# Patient Record
Sex: FEMALE | Race: OTHER | NOT HISPANIC OR LATINO
[De-identification: names, ages, dates, MRNs, and addresses within clinical notes are randomized per-mention and may not be internally consistent; named-entity substitution may affect disease eponyms.]

---

## 2020-06-11 PROBLEM — Z00.00 ENCOUNTER FOR PREVENTIVE HEALTH EXAMINATION: Status: ACTIVE | Noted: 2020-06-11

## 2020-06-30 ENCOUNTER — LABORATORY RESULT (OUTPATIENT)
Age: 60
End: 2020-06-30

## 2020-06-30 ENCOUNTER — APPOINTMENT (OUTPATIENT)
Dept: PULMONOLOGY | Facility: CLINIC | Age: 60
End: 2020-06-30
Payer: COMMERCIAL

## 2020-06-30 VITALS
WEIGHT: 163.19 LBS | BODY MASS INDEX: 30.03 KG/M2 | HEIGHT: 62 IN | HEART RATE: 75 BPM | SYSTOLIC BLOOD PRESSURE: 125 MMHG | DIASTOLIC BLOOD PRESSURE: 83 MMHG | TEMPERATURE: 97.7 F | OXYGEN SATURATION: 97 %

## 2020-06-30 DIAGNOSIS — Z83.3 FAMILY HISTORY OF DIABETES MELLITUS: ICD-10-CM

## 2020-06-30 DIAGNOSIS — E78.5 HYPERLIPIDEMIA, UNSPECIFIED: ICD-10-CM

## 2020-06-30 DIAGNOSIS — Z80.0 FAMILY HISTORY OF MALIGNANT NEOPLASM OF DIGESTIVE ORGANS: ICD-10-CM

## 2020-06-30 DIAGNOSIS — Z78.9 OTHER SPECIFIED HEALTH STATUS: ICD-10-CM

## 2020-06-30 PROCEDURE — 99244 OFF/OP CNSLTJ NEW/EST MOD 40: CPT

## 2020-06-30 RX ORDER — SIMVASTATIN 80 MG/1
TABLET, FILM COATED ORAL
Refills: 0 | Status: ACTIVE | COMMUNITY

## 2020-06-30 NOTE — PHYSICAL EXAM
[No Acute Distress] : no acute distress [Normal Appearance] : normal appearance [Normal Oropharynx] : normal oropharynx [Normal S1, S2] : normal s1, s2 [No Neck Mass] : no neck mass [Normal Rate/Rhythm] : normal rate/rhythm [No Murmurs] : no murmurs [No Resp Distress] : no resp distress [Clear to Auscultation Bilaterally] : clear to auscultation bilaterally [No Abnormalities] : no abnormalities [Benign] : benign [No Cyanosis] : no cyanosis [Normal Gait] : normal gait [No Clubbing] : no clubbing [FROM] : FROM [No Edema] : no edema [No Focal Deficits] : no focal deficits [Normal Color/ Pigmentation] : normal color/ pigmentation [Oriented x3] : oriented x3 [Normal Affect] : normal affect

## 2020-06-30 NOTE — PROCEDURE
[FreeTextEntry1] : CT chest from 6/4/2020: bronchial thickening, peripheral reticulations at bases, right lung apex with scarring and bronchiectasis, bilateral GGOs at bases; no honeycombing. Compared to 2012 imaging these findings are worse (disk is not available for review).

## 2020-06-30 NOTE — ASSESSMENT
[FreeTextEntry1] : 60yo woman with HLD, Raynaud's and asthma referred for evaluation of CT chest findings. Referred by Dr. Cerrato. Ms. rAaya has signs of interstitial lung disease c/w NSIP pattern of unclear etiology. She is not even aware of the 2012 findings. Has no pulmonary symptoms and no history suggestive of clear etiology of lung fibrosis. This may be connected to her Raynaud's and asthma diagnoses as well. Will check further ILD related serology, as well as PFTs and 6MWT. Once PFTs are resulted will be able to provide full risk stratification for her cervical surgery.\par \par PCP: Dr. Sherwin Cerrato, [phone]\par Surgeon: Dr. Dell Gonzalez, [phone], [address]

## 2020-06-30 NOTE — CONSULT LETTER
[Dear  ___] : Dear  [unfilled], [Consult Letter:] : I had the pleasure of evaluating your patient, [unfilled]. [Please see my note below.] : Please see my note below. [Consult Closing:] : Thank you very much for allowing me to participate in the care of this patient.  If you have any questions, please do not hesitate to contact me. [DrGarcía  ___] : Dr. MENDES [FreeTextEntry3] : Helena Cota MD\par \par [employer] [facility] [employer] at [facility]\par Pulmonary, Critical Care, and Sleep Medicine\par  [Sincerely,] : Sincerely,

## 2020-07-02 ENCOUNTER — TRANSCRIPTION ENCOUNTER (OUTPATIENT)
Age: 60
End: 2020-07-02

## 2020-07-07 LAB
ALBUMIN SERPL ELPH-MCNC: 4.7 G/DL
ALP BLD-CCNC: 111 U/L
ALT SERPL-CCNC: 19 U/L
ANA PAT FLD IF-IMP: ABNORMAL
ANA PATTERN: ABNORMAL
ANA SER IF-ACNC: ABNORMAL
ANA TITER: ABNORMAL
ANION GAP SERPL CALC-SCNC: 14 MMOL/L
AST SERPL-CCNC: 19 U/L
BASOPHILS # BLD AUTO: 0.1 K/UL
BASOPHILS NFR BLD AUTO: 1.2 %
BILIRUB SERPL-MCNC: 0.2 MG/DL
BUN SERPL-MCNC: 12 MG/DL
CALCIUM SERPL-MCNC: 9.8 MG/DL
CCP AB SER IA-ACNC: <8 UNITS
CHLORIDE SERPL-SCNC: 104 MMOL/L
CK SERPL-CCNC: 94 U/L
CO2 SERPL-SCNC: 24 MMOL/L
CREAT SERPL-MCNC: 0.76 MG/DL
DSDNA AB SER-ACNC: <12 IU/ML
ENA JO1 AB SER IA-ACNC: <0.2 AL
ENA SCL70 IGG SER IA-ACNC: <0.2 AL
ENA SS-A AB SER IA-ACNC: <0.2 AL
ENA SS-B AB SER IA-ACNC: <0.2 AL
EOSINOPHIL # BLD AUTO: 0.32 K/UL
EOSINOPHIL NFR BLD AUTO: 3.9 %
GLUCOSE SERPL-MCNC: 70 MG/DL
HCT VFR BLD CALC: 46.1 %
HGB BLD-MCNC: 14.4 G/DL
IMM GRANULOCYTES NFR BLD AUTO: 0.5 %
LYMPHOCYTES # BLD AUTO: 2.26 K/UL
LYMPHOCYTES NFR BLD AUTO: 27.7 %
MAN DIFF?: NORMAL
MCHC RBC-ENTMCNC: 29.6 PG
MCHC RBC-ENTMCNC: 31.2 GM/DL
MCV RBC AUTO: 94.7 FL
MONOCYTES # BLD AUTO: 0.96 K/UL
MONOCYTES NFR BLD AUTO: 11.8 %
MPO AB + PR3 PNL SER: NORMAL
NEUTROPHILS # BLD AUTO: 4.48 K/UL
NEUTROPHILS NFR BLD AUTO: 54.9 %
PLATELET # BLD AUTO: 308 K/UL
POTASSIUM SERPL-SCNC: 5.1 MMOL/L
PROT SERPL-MCNC: 8 G/DL
RBC # BLD: 4.87 M/UL
RBC # FLD: 15.5 %
RF+CCP IGG SER-IMP: NEGATIVE
RHEUMATOID FACT SER QL: 10 IU/ML
SODIUM SERPL-SCNC: 142 MMOL/L
WBC # FLD AUTO: 8.16 K/UL

## 2020-07-13 ENCOUNTER — LABORATORY RESULT (OUTPATIENT)
Age: 60
End: 2020-07-13

## 2020-07-15 ENCOUNTER — APPOINTMENT (OUTPATIENT)
Dept: PULMONOLOGY | Facility: CLINIC | Age: 60
End: 2020-07-15
Payer: COMMERCIAL

## 2020-07-15 VITALS
WEIGHT: 165 LBS | HEART RATE: 64 BPM | SYSTOLIC BLOOD PRESSURE: 102 MMHG | OXYGEN SATURATION: 97 % | BODY MASS INDEX: 30.36 KG/M2 | TEMPERATURE: 96.5 F | HEIGHT: 62 IN | DIASTOLIC BLOOD PRESSURE: 68 MMHG

## 2020-07-15 PROCEDURE — 94726 PLETHYSMOGRAPHY LUNG VOLUMES: CPT

## 2020-07-15 PROCEDURE — ZZZZZ: CPT

## 2020-07-15 PROCEDURE — 94618 PULMONARY STRESS TESTING: CPT

## 2020-07-15 PROCEDURE — 94010 BREATHING CAPACITY TEST: CPT

## 2020-07-15 PROCEDURE — 99214 OFFICE O/P EST MOD 30 MIN: CPT | Mod: 25

## 2020-07-15 PROCEDURE — 94729 DIFFUSING CAPACITY: CPT

## 2020-07-15 NOTE — PROCEDURE
[FreeTextEntry1] : PFTs from 7/15/2020\par *FEV1 85\par *FEV1/FVC 85\par *TLC 96\par *DLCO 54\par \par 6MWT from 7/15/2020\par pre/post HR 72/101\par pre/post saturation 99/95\par pre/post Alex Dyspnea  2/2\par pre/post Alex Fatigue  2/2\par meters walked 1700\par \par \par

## 2020-07-15 NOTE — CONSULT LETTER
[Dear  ___] : Dear  [unfilled], [Please see my note below.] : Please see my note below. [Consult Letter:] : I had the pleasure of evaluating your patient, [unfilled]. [Consult Closing:] : Thank you very much for allowing me to participate in the care of this patient.  If you have any questions, please do not hesitate to contact me. [DrGarcía  ___] : Dr. MENDES [FreeTextEntry3] : Helena Cota MD\par \par [place] [facility] [employer] at [facility]\par Pulmonary, Critical Care, and Sleep Medicine\par  [Sincerely,] : Sincerely,

## 2020-07-15 NOTE — PHYSICAL EXAM
[No Acute Distress] : no acute distress [Normal Appearance] : normal appearance [Normal Oropharynx] : normal oropharynx [Normal S1, S2] : normal s1, s2 [Normal Rate/Rhythm] : normal rate/rhythm [No Neck Mass] : no neck mass [Clear to Auscultation Bilaterally] : clear to auscultation bilaterally [No Murmurs] : no murmurs [No Abnormalities] : no abnormalities [No Resp Distress] : no resp distress [Benign] : benign [Normal Gait] : normal gait [No Clubbing] : no clubbing [No Cyanosis] : no cyanosis [FROM] : FROM [No Edema] : no edema [Oriented x3] : oriented x3 [Normal Color/ Pigmentation] : normal color/ pigmentation [No Focal Deficits] : no focal deficits [Normal Affect] : normal affect

## 2020-07-15 NOTE — HISTORY OF PRESENT ILLNESS
[Former] : former [Never] : never [TextBox_4] : 60yo woman with HLD, Raynaud's and asthma referred for evaluation of CT chest findings. She is pending cervical spine surgery and during that workup had chest imaging. Imaging done at [facility]. No pulmonary symptoms. She had a diagnosis of asthma about 30 years ago with rare use of FRANCISCO JAVIER. Also diagnosed with Raynaud's at around the time. Able to exercise without any limitations. 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 No direct exposures to asbestos, mold that she is aware of. Very mild smoking in her 20s, but minimal. Never intubated for asthma; rare prednisone use. Does not use a maintenance inhaler. \par \par \par 7/15/2020\par 58 yo F with Raynaud's syndrome initially presenting for abnormal CT scan performed in the context of a pre-op eval and prior abnormal CT. She is here for follow up to discuss lab test results and PFT for further evaluation. Aside from her persistent neck and back pain, she feels well and has no respiratory symptoms including dyspnea (at rest and exertion).

## 2022-11-28 ENCOUNTER — NON-APPOINTMENT (OUTPATIENT)
Age: 62
End: 2022-11-28

## 2022-12-23 ENCOUNTER — APPOINTMENT (OUTPATIENT)
Dept: PULMONOLOGY | Facility: CLINIC | Age: 62
End: 2022-12-23

## 2022-12-23 VITALS
TEMPERATURE: 97.8 F | BODY MASS INDEX: 30.18 KG/M2 | SYSTOLIC BLOOD PRESSURE: 106 MMHG | DIASTOLIC BLOOD PRESSURE: 73 MMHG | HEIGHT: 62 IN | OXYGEN SATURATION: 93 % | WEIGHT: 164 LBS | HEART RATE: 83 BPM

## 2022-12-23 PROCEDURE — 99214 OFFICE O/P EST MOD 30 MIN: CPT

## 2022-12-24 NOTE — ASSESSMENT
[FreeTextEntry1] : PFTs from 7/15/2020\par *FEV1 85\par *FEV1/FVC 85\par *TLC 96\par *DLCO 54\par REVIEWED\par 6MWT from 7/15/2020\par pre/post HR 72/101\par pre/post saturation 99/95\par pre/post Alex Dyspnea 2/2\par pre/post Alex Fatigue 2/2\par meters walked 1700\par \par \par PFTs from 7/15/2020\par *FEV1 85\par *FEV1/FVC 85\par *TLC 96\par *DLCO 54\par \par 6MWT from 7/15/2020\par pre/post HR 72/101\par pre/post saturation 99/95\par pre/post Alex Dyspnea 2/2\par pre/post Alex Fatigue 2/2\par meters walked 1700\par \par 58yo F w/ hx of asthma, Raynaud's presenting with abnormal CT chest  following up for SOB/cough which have been persistent after recent COVID19 infection. Part of her symptoms are driven by PND; rx for flonase and azelastine prescribed; needs to use it for at least 4 weeks to see benefit. However, due to hx of ILD need to ensure that there is no ILD progression driving her symptoms. Prior CT in 2020 showed mild basilar predominant bronchiectasis; peripheral reticular changes; and some GGO's in the left lower lobe. Images were unable to be reviewed as she did not have her CD with her (requested a copy from [facility] but never received; asker to get me the CD). Per report, may be NSIP which in the context of her Raynaud's dx and elevated NORM, could be the case. She is indicated for repeat CT chest and PFTs/6MWT. Follow up after PFTs/6MWT, CT chest and 6 weeks of PND treatment.

## 2022-12-24 NOTE — CONSULT LETTER
[Dear  ___] : Dear  [unfilled], [Courtesy Letter:] : I had the pleasure of seeing your patient, [unfilled], in my office today. [Please see my note below.] : Please see my note below. [Consult Closing:] : Thank you very much for allowing me to participate in the care of this patient.  If you have any questions, please do not hesitate to contact me. [Sincerely,] : Sincerely, [FreeTextEntry3] : Helena Cota MD\par \par [place] [employer] at [facility]\par Pulmonary, Critical Care, and Sleep Medicine\par

## 2022-12-24 NOTE — HISTORY OF PRESENT ILLNESS
[TextBox_4] : 58yo woman with HLD, Raynaud's and asthma referred for evaluation of CT chest findings. She is pending cervical spine surgery and during that workup had chest imaging. Imaging done at [facility]. No pulmonary symptoms. She had a diagnosis of asthma about 30 years ago with rare use of FRANCISCO JAVIER. Also diagnosed with Raynaud's at around the time. Able to exercise without any limitations. 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 No direct exposures to asbestos, mold that she is aware of. Very mild smoking in her 20s, but minimal. Never intubated for asthma; rare prednisone use. Does not use a maintenance inhaler. \par \par \par 7/15/2020\par 58 yo F with Raynaud's syndrome initially presenting for abnormal CT scan performed in the context of a pre-op eval and prior abnormal CT. She is here for follow up to discuss lab test results and PFT for further evaluation. Aside from her persistent neck and back pain, she feels well and has no respiratory symptoms including dyspnea (at rest and exertion).\par \par 12/23/2022\par Last follow up in 2020. Had COVID in Sept/Oct 2022 and since then has had an issue with chest congestion and cough. Recovered from COVID at home; did not take paxlovid. No imaging since 2020.  [ESS] : 5

## 2022-12-27 ENCOUNTER — NON-APPOINTMENT (OUTPATIENT)
Age: 62
End: 2022-12-27

## 2023-01-23 ENCOUNTER — APPOINTMENT (OUTPATIENT)
Dept: CT IMAGING | Facility: HOSPITAL | Age: 63
End: 2023-01-23
Payer: COMMERCIAL

## 2023-01-23 ENCOUNTER — OUTPATIENT (OUTPATIENT)
Dept: OUTPATIENT SERVICES | Facility: HOSPITAL | Age: 63
LOS: 1 days | End: 2023-01-23
Payer: COMMERCIAL

## 2023-01-23 PROCEDURE — 71250 CT THORAX DX C-: CPT

## 2023-01-23 PROCEDURE — 71250 CT THORAX DX C-: CPT | Mod: 26

## 2023-01-25 ENCOUNTER — APPOINTMENT (OUTPATIENT)
Dept: PULMONOLOGY | Facility: CLINIC | Age: 63
End: 2023-01-25
Payer: COMMERCIAL

## 2023-01-25 PROCEDURE — ZZZZZ: CPT

## 2023-01-25 PROCEDURE — 94060 EVALUATION OF WHEEZING: CPT

## 2023-01-25 PROCEDURE — 94727 GAS DIL/WSHOT DETER LNG VOL: CPT

## 2023-01-25 PROCEDURE — 94729 DIFFUSING CAPACITY: CPT

## 2023-01-25 PROCEDURE — 94618 PULMONARY STRESS TESTING: CPT

## 2023-01-27 ENCOUNTER — NON-APPOINTMENT (OUTPATIENT)
Age: 63
End: 2023-01-27

## 2023-02-01 RX ORDER — ALBUTEROL SULFATE 2.5 MG/3ML
(2.5 MG/3ML) SOLUTION RESPIRATORY (INHALATION) EVERY 6 HOURS
Qty: 1 | Refills: 3 | Status: ACTIVE | COMMUNITY
Start: 2023-02-01 | End: 1900-01-01

## 2023-02-09 ENCOUNTER — APPOINTMENT (OUTPATIENT)
Dept: PULMONOLOGY | Facility: CLINIC | Age: 63
End: 2023-02-09
Payer: COMMERCIAL

## 2023-02-09 VITALS
WEIGHT: 163 LBS | RESPIRATION RATE: 12 BRPM | HEIGHT: 62 IN | DIASTOLIC BLOOD PRESSURE: 74 MMHG | TEMPERATURE: 97.7 F | OXYGEN SATURATION: 97 % | BODY MASS INDEX: 30 KG/M2 | SYSTOLIC BLOOD PRESSURE: 114 MMHG | HEART RATE: 84 BPM

## 2023-02-09 PROCEDURE — 99214 OFFICE O/P EST MOD 30 MIN: CPT

## 2023-02-10 RX ORDER — PANTOPRAZOLE 40 MG/1
40 TABLET, DELAYED RELEASE ORAL DAILY
Qty: 1 | Refills: 0 | Status: ACTIVE | COMMUNITY
Start: 2023-02-10 | End: 1900-01-01

## 2023-02-12 NOTE — HISTORY OF PRESENT ILLNESS
[TextBox_4] : 60yo woman with HLD, Raynaud's and asthma referred for evaluation of CT chest findings. She is pending cervical spine surgery and during that workup had chest imaging. Imaging done at [facility]. No pulmonary symptoms. She had a diagnosis of asthma about 30 years ago with rare use of FRANCISCO JAVIER. Also diagnosed with Raynaud's at around the time. Able to exercise without any limitations. 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 No direct exposures to asbestos, mold that she is aware of. Very mild smoking in her 20s, but minimal. Never intubated for asthma; rare prednisone use. Does not use a maintenance inhaler. \par \par \par 7/15/2020\par 60 yo F with Raynaud's syndrome initially presenting for abnormal CT scan performed in the context of a pre-op eval and prior abnormal CT. She is here for follow up to discuss lab test results and PFT for further evaluation. Aside from her persistent neck and back pain, she feels well and has no respiratory symptoms including dyspnea (at rest and exertion).\par \par 12/23/2022\par Last follow up in 2020. Had COVID in Sept/Oct 2022 and since then has had an issue with chest congestion and cough. Recovered from COVID at home; did not take paxlovid. No imaging since 2020. \par \par 2/9/23\par Patient presenting for follow up on cough. She has had prolonged coughing triggered by PFTs she performed in last January. The cough has been episodic, occurring throughout the day, and occasionally productive of white/yellow sputum. No associated fever, chills, dyspnea, nausea or vomiting. She was prescribed ICS but was escalated to ICS/LABA by her PCP w/ mild improvement. She had a chest xray performed which she was told was normal and was prescribed a course of doxycycline which has helped improve her cough. She has noted occasional wheezing during coughing fits and the cough occurs at night. She has not taken any prednisone.

## 2023-02-12 NOTE — ASSESSMENT
[FreeTextEntry1] : REVIEWED\par PFTs from 7/15/2020\par *FEV1 85\par *FEV1/FVC 85\par *TLC 96\par *DLCO 54\par REVIEWED\par 6MWT from 7/15/2020\par pre/post HR 72/101\par pre/post saturation 99/95\par pre/post Alex Dyspnea 2/2\par pre/post Alex Fatigue 2/2\par meters walked 1700\par \par PFTs from 7/15/2020\par *FEV1 85\par *FEV1/FVC 85\par *TLC 96\par *DLCO 54\par \par 6MWT from 7/15/2020\par pre/post HR 72/101\par pre/post saturation 99/95\par pre/post Alex Dyspnea 2/2\par pre/post Alex Fatigue 2/2\par meters walked 1700\par \par CT 1/23/22: Lungs are stable. Stable right apical scarring and bronchiectasis. Scattered pleural-based centimeter or last pulmonary nodular densities. No areas of airspace consolidation. No suspicious pulmonary nodules.\par \par PFT 1/25/23\par FVC 2.01 (70), FEV1 1.64 (73), FEV1/FVC 81, TLC 3.39 (74), RV 1.3 (6.9), DLCO 10.04 (48)\par 6MWT 1/25/23\par 467 m, Pre spO2 96%, Post 92%. Alex 2->2. \par \par 1. Asthma, in exacerbation \par - Patient w/ history of asthma and currently with prolonged coughing and wheezing after PFT. Suspect patient may have had a component of bronchospasm triggered by the breathing exercises associated with her PFT. She is mildly improved with time and ICS/LABA however continues to have cough. Alternative is ILD exacerbation versus infection. Will give a 7 day course of prednisone for possible asthma exacerbation and observe effects. She will continue ICS/LABA and prn albuterol. Patient to keep track of symptoms and call us if her symptoms are not improving. \par \par 2. ILD\par - Patient with evidence of nodular densities distributed along the periphery of her lung with interstitial lung markings. She also has some focal bronchiectasis and thickening of the bronchovascular bundle. There is no pre covid imaging to compare to but suspect that the patient may have an element of organizing pneumonia as a result of her COVID 19 infection. At baseline she has no pulmonary symptoms and thus far we have elected to clinically observe her which she is in agreement with. Her most recent symptoms are likely related to asthma more so than ILD but if her symptoms persist despite prednisone course then she may require further evaluation including extended corticosteroids versus bronchoscopy. Today's PFTs with severe reduction in diffusion\par \par Follow up in 2 to 4 weeks.\par

## 2023-02-12 NOTE — REVIEW OF SYSTEMS
[Cough] : cough [Chest Tightness] : chest tightness [Sputum] : sputum [Wheezing] : wheezing [Fever] : no fever [Fatigue] : no fatigue [Chills] : no chills [Sinus Problems] : no sinus problems [Hemoptysis] : no hemoptysis [Dyspnea] : no dyspnea [SOB on Exertion] : no sob on exertion [Chest Discomfort] : no chest discomfort [Orthopnea] : no orthopnea [GERD] : no gerd [Nocturia] : no nocturia [Arthralgias] : no arthralgias [Myalgias] : no myalgias

## 2023-02-12 NOTE — CONSULT LETTER
[Dear  ___] : Dear  [unfilled], [Courtesy Letter:] : I had the pleasure of seeing your patient, [unfilled], in my office today. [Please see my note below.] : Please see my note below. [Consult Closing:] : Thank you very much for allowing me to participate in the care of this patient.  If you have any questions, please do not hesitate to contact me. [Sincerely,] : Sincerely, [FreeTextEntry3] : Helena Cota MD\par \par [place] [facility] [employer] at [facility]\par Pulmonary, Critical Care, and Sleep Medicine\par

## 2023-02-12 NOTE — PHYSICAL EXAM
[No Acute Distress] : no acute distress [Normal Oropharynx] : normal oropharynx [Normal Rate/Rhythm] : normal rate/rhythm [Normal S1, S2] : normal s1, s2 [No Resp Distress] : no resp distress [No Acc Muscle Use] : no acc muscle use [No Clubbing] : no clubbing [Normal Gait] : normal gait [Oriented x3] : oriented x3 [Normal Affect] : normal affect [TextBox_68] : velcro crackles RLB, no wheezing noted.

## 2023-04-06 ENCOUNTER — APPOINTMENT (OUTPATIENT)
Dept: PULMONOLOGY | Facility: CLINIC | Age: 63
End: 2023-04-06

## 2023-04-21 ENCOUNTER — APPOINTMENT (OUTPATIENT)
Dept: PULMONOLOGY | Facility: CLINIC | Age: 63
End: 2023-04-21
Payer: COMMERCIAL

## 2023-04-21 PROCEDURE — 99213 OFFICE O/P EST LOW 20 MIN: CPT | Mod: 95

## 2023-04-21 RX ORDER — FLUTICASONE FUROATE 200 UG/1
200 POWDER RESPIRATORY (INHALATION) DAILY
Qty: 1 | Refills: 2 | Status: DISCONTINUED | COMMUNITY
Start: 2023-02-01 | End: 2023-04-21

## 2023-04-23 NOTE — CONSULT LETTER
[Dear  ___] : Dear  [unfilled], [Courtesy Letter:] : I had the pleasure of seeing your patient, [unfilled], in my office today. [Please see my note below.] : Please see my note below. [Consult Closing:] : Thank you very much for allowing me to participate in the care of this patient.  If you have any questions, please do not hesitate to contact me. [Sincerely,] : Sincerely, [FreeTextEntry3] : Helena Cota MD\par \par [place] [facility] [employer] at [facility]\par Pulmonary, Critical Care, and Sleep Medicine\par

## 2023-04-23 NOTE — HISTORY OF PRESENT ILLNESS
[Home] : at home, [unfilled] , at the time of the visit. [Medical Office: ([facility])___] : at the medical office located in  [Verbal consent obtained from patient] : the patient, [unfilled] [TextBox_4] : 58yo woman with HLD, Raynaud's and asthma referred for evaluation of CT chest findings. She is pending cervical spine surgery and during that workup had chest imaging. Imaging done at [facility]. No pulmonary symptoms. She had a diagnosis of asthma about 30 years ago with rare use of FRANCISCO JAVIER. Also diagnosed with Raynaud's at around the time. Able to exercise without any limitations. 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 No direct exposures to asbestos, mold that she is aware of. Very mild smoking in her 20s, but minimal. Never intubated for asthma; rare prednisone use. Does not use a maintenance inhaler. \par \par \par 7/15/2020\par 60 yo F with Raynaud's syndrome initially presenting for abnormal CT scan performed in the context of a pre-op eval and prior abnormal CT. She is here for follow up to discuss lab test results and PFT for further evaluation. Aside from her persistent neck and back pain, she feels well and has no respiratory symptoms including dyspnea (at rest and exertion).\par \par 12/23/2022\par Last follow up in 2020. Had COVID in Sept/Oct 2022 and since then has had an issue with chest congestion and cough. Recovered from COVID at home; did not take paxlovid. No imaging since 2020. \par \par 2/9/23\par Patient presenting for follow up on cough. She has had prolonged coughing triggered by PFTs she performed in last January. The cough has been episodic, occurring throughout the day, and occasionally productive of white/yellow sputum. No associated fever, chills, dyspnea, nausea or vomiting. She was prescribed ICS but was escalated to ICS/LABA by her PCP w/ mild improvement. She had a chest xray performed which she was told was normal and was prescribed a course of doxycycline which has helped improve her cough. She has noted occasional wheezing during coughing fits and the cough occurs at night. She has not taken any prednisone. \par \par 4/21/2023\par Improved and at baseline. No SOB. Using ICS/LABA once a day. LAMA once a day. Allergies are exacerbating. [ESS] : 5

## 2023-04-23 NOTE — ASSESSMENT
[FreeTextEntry1] : REVIEWED\par PFTs from 7/15/2020\par *FEV1 85\par *FEV1/FVC 85\par *TLC 96\par *DLCO 54\par REVIEWED\par 6MWT from 7/15/2020\par pre/post HR 72/101\par pre/post saturation 99/95\par pre/post Alex Dyspnea 2/2\par pre/post Alex Fatigue 2/2\par meters walked 1700\par \par PFTs from 7/15/2020\par *FEV1 85\par *FEV1/FVC 85\par *TLC 96\par *DLCO 54\par \par 6MWT from 7/15/2020\par pre/post HR 72/101\par pre/post saturation 99/95\par pre/post Alex Dyspnea 2/2\par pre/post Alex Fatigue 2/2\par meters walked 1700\par \par CT 1/23/22: Lungs are stable. Stable right apical scarring and bronchiectasis. Scattered pleural-based centimeter or last pulmonary nodular densities. No areas of airspace consolidation. No suspicious pulmonary nodules.\par \par PFT 1/25/23\par FVC 2.01 (70), FEV1 1.64 (73), FEV1/FVC 81, TLC 3.39 (74), RV 1.3 (6.9), DLCO 10.04 (48)\par 6MWT 1/25/23\par 467 m, Pre spO2 96%, Post 92%. Alex 2->2. \par \par 1. Asthma, in exacerbation now resolved\par - S/p steroids in February 2023. Should be using ICS/LABA 2 times per day and c/w LAMA.  \par \par 2. ILD\par - Patient with evidence of nodular densities distributed along the periphery of her lung with interstitial lung markings. She also has some focal bronchiectasis and thickening of the bronchovascular bundle. There is no pre covid imaging to compare to but suspect that the patient may have an element of organizing pneumonia as a result of her COVID 19 infection. At baseline she has no pulmonary symptoms and thus far we have elected to clinically observe her which she is in agreement with. Last PFTs with severe reduction in diffusion. \par \par Follow up in 3 months.\par  \par \par  \par 
Hide Cetaphil Products: No
Action 1: Continue
Other Instructions: F/u in 2 months\\n
Start Regimen: Minocycline 90mg ER daily\\nAdapalene nightly
Detail Level: Zone
Samples Given: Ximino 135mg

## 2023-05-23 ENCOUNTER — NON-APPOINTMENT (OUTPATIENT)
Age: 63
End: 2023-05-23

## 2023-08-04 ENCOUNTER — APPOINTMENT (OUTPATIENT)
Dept: PULMONOLOGY | Facility: CLINIC | Age: 63
End: 2023-08-04
Payer: COMMERCIAL

## 2023-08-04 DIAGNOSIS — Z23 ENCOUNTER FOR IMMUNIZATION: ICD-10-CM

## 2023-08-04 PROCEDURE — 99442: CPT

## 2023-08-04 RX ORDER — PREDNISONE 20 MG/1
20 TABLET ORAL
Qty: 14 | Refills: 0 | Status: DISCONTINUED | COMMUNITY
Start: 2023-02-09 | End: 2023-08-04

## 2023-08-04 RX ORDER — FLUTICASONE PROPIONATE 50 UG/1
50 SPRAY, METERED NASAL
Qty: 1 | Refills: 3 | Status: DISCONTINUED | COMMUNITY
Start: 2022-12-23 | End: 2023-08-04

## 2023-08-04 RX ORDER — ALBUTEROL SULFATE 90 UG/1
108 (90 BASE) INHALANT RESPIRATORY (INHALATION)
Qty: 1 | Refills: 2 | Status: ACTIVE | COMMUNITY
Start: 2023-02-01 | End: 1900-01-01

## 2023-08-04 RX ORDER — AZELASTINE HYDROCHLORIDE 137 UG/1
137 SPRAY, METERED NASAL DAILY
Qty: 1 | Refills: 2 | Status: DISCONTINUED | COMMUNITY
Start: 2022-12-23 | End: 2023-08-04

## 2023-08-04 RX ORDER — BUDESONIDE AND FORMOTEROL FUMARATE DIHYDRATE 160; 4.5 UG/1; UG/1
160-4.5 AEROSOL RESPIRATORY (INHALATION) TWICE DAILY
Qty: 2 | Refills: 1 | Status: ACTIVE | COMMUNITY
Start: 2023-02-09 | End: 1900-01-01

## 2023-08-07 PROBLEM — Z23 ENCOUNTER FOR IMMUNIZATION: Status: ACTIVE | Noted: 2023-08-07 | Resolved: 2023-08-21

## 2023-08-07 NOTE — ASSESSMENT
[FreeTextEntry1] : REVIEWED 6MWT from 7/15/2020 pre/post HR 72/101 pre/post saturation 99/95 pre/post Alex Dyspnea 2/2 pre/post Alex Fatigue 2/2 meters walked 1700  PFTs from 7/15/2020 *FEV1 85 *FEV1/FVC 85 *TLC 96 *DLCO 54  6MWT from 7/15/2020 pre/post HR 72/101 pre/post saturation 99/95 pre/post Alex Dyspnea 2/2 pre/post Alex Fatigue 2/2 meters walked 1700  CT 1/23/22: Lungs are stable. Stable right apical scarring and bronchiectasis. Scattered pleural-based centimeter or last pulmonary nodular densities. No areas of airspace consolidation. No suspicious pulmonary nodules.  PFT 1/25/23 FVC 2.01 (70), FEV1 1.64 (73), FEV1/FVC 81, TLC 3.39 (74), RV 1.3 (6.9), DLCO 10.04 (48)  6MWT 1/25/23 467 m, Pre spO2 96%, Post 92%. Alex 2->2.  60yo woman with HLD, Raynaud's and asthma following up. 1. ILD - Patient with evidence of nodular densities distributed along the periphery of her lung with interstitial lung markings. She also has some focal bronchiectasis and thickening of the bronchovascular bundle. There is no pre covid imaging to compare to but suspect that the patient may have an element of organizing pneumonia as a result of her COVID 19 infection. At baseline she has no pulmonary symptoms and thus far we have elected to clinically observe her which she is in agreement with. Last PFTs with severe reduction in diffusion. Has a diagnosis of asthma that predates current CT chest findings but most likely had a mild ILD exacerbation in Feb 2023 which resolved with steroids.  -c/w inhalers as these have been helfpul -need repeat PFTs, 6MWT and CT chest at end of 2023.   Follow up in 3 to 4 months.

## 2023-08-07 NOTE — HISTORY OF PRESENT ILLNESS
[Home] : at home, [unfilled] , at the time of the visit. [Medical Office: ([place] [facility])___] : at the medical office located in  [Verbal consent obtained from patient] : the patient, [unfilled] [Former] : former [< 20 pack-years] : < 20 pack-years [Never] : never [TextBox_4] : 60yo woman with HLD, Raynaud's and asthma referred for evaluation of CT chest findings. She is pending cervical spine surgery and during that workup had chest imaging. Imaging done at [facility]. No pulmonary symptoms. She had a diagnosis of asthma about 30 years ago with rare use of FRANCISCO JAVIER. Also diagnosed with Raynaud's at around the time. Able to exercise without any limitations. 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 No direct exposures to asbestos, mold that she is aware of. Very mild smoking in her 20s, but minimal. Never intubated for asthma; rare prednisone use. Does not use a maintenance inhaler.   7/15/2020 60 yo F with Raynaud's syndrome initially presenting for abnormal CT scan performed in the context of a pre-op eval and prior abnormal CT. She is here for follow up to discuss lab test results and PFT for further evaluation. Aside from her persistent neck and back pain, she feels well and has no respiratory symptoms including dyspnea (at rest and exertion).   12/23/2022 Last follow up in 2020. Had COVID in Sept/Oct 2022 and since then has had an issue with chest congestion and cough. Recovered from COVID at home; did not take paxlovid. No imaging since 2020.   2/9/23 Patient presenting for follow up on cough. She has had prolonged coughing triggered by PFTs she performed in last January. The cough has been episodic, occurring throughout the day, and occasionally productive of white/yellow sputum. No associated fever, chills, dyspnea, nausea or vomiting. She was prescribed ICS but was escalated to ICS/LABA by her PCP w/ mild improvement. She had a chest xray performed which she was told was normal and was prescribed a course of doxycycline which has helped improve her cough. She has noted occasional wheezing during coughing fits and the cough occurs at night. She has not taken any prednisone.   4/21/2023 Improved and at baseline. No SOB. Using ICS/LABA once a day. LAMA once a day. Allergies are exacerbating.  8/4/2023 Had her C spine surgery; still in neck collar. Pain improving. Pulmonary symptoms have been stable. No new exacerbations.   [ESS] : 6

## 2023-08-07 NOTE — CONSULT LETTER
[Dear  ___] : Dear  [unfilled], [Courtesy Letter:] : I had the pleasure of seeing your patient, [unfilled], in my office today. [Please see my note below.] : Please see my note below. [Consult Closing:] : Thank you very much for allowing me to participate in the care of this patient.  If you have any questions, please do not hesitate to contact me. [Sincerely,] : Sincerely, [FreeTextEntry3] : Helena Cota MD  [employer] at [facility] Pulmonary, Critical Care, and Sleep Medicine

## 2023-12-06 ENCOUNTER — NON-APPOINTMENT (OUTPATIENT)
Age: 63
End: 2023-12-06

## 2024-02-02 ENCOUNTER — NON-APPOINTMENT (OUTPATIENT)
Age: 64
End: 2024-02-02

## 2024-02-06 ENCOUNTER — NON-APPOINTMENT (OUTPATIENT)
Age: 64
End: 2024-02-06

## 2024-02-08 ENCOUNTER — APPOINTMENT (OUTPATIENT)
Dept: PULMONOLOGY | Facility: CLINIC | Age: 64
End: 2024-02-08
Payer: COMMERCIAL

## 2024-02-08 VITALS
OXYGEN SATURATION: 97 % | TEMPERATURE: 97.7 F | SYSTOLIC BLOOD PRESSURE: 119 MMHG | DIASTOLIC BLOOD PRESSURE: 81 MMHG | HEART RATE: 81 BPM | RESPIRATION RATE: 12 BRPM | BODY MASS INDEX: 29.44 KG/M2 | WEIGHT: 160 LBS | HEIGHT: 62 IN

## 2024-02-08 DIAGNOSIS — I73.00 RAYNAUD'S SYNDROME W/OUT GANGRENE: ICD-10-CM

## 2024-02-08 DIAGNOSIS — J47.9 BRONCHIECTASIS, UNCOMPLICATED: ICD-10-CM

## 2024-02-08 DIAGNOSIS — J45.909 UNSPECIFIED ASTHMA, UNCOMPLICATED: ICD-10-CM

## 2024-02-08 DIAGNOSIS — J84.9 INTERSTITIAL PULMONARY DISEASE, UNSPECIFIED: ICD-10-CM

## 2024-02-08 DIAGNOSIS — R09.82 POSTNASAL DRIP: ICD-10-CM

## 2024-02-08 PROCEDURE — 94010 BREATHING CAPACITY TEST: CPT

## 2024-02-08 PROCEDURE — 94727 GAS DIL/WSHOT DETER LNG VOL: CPT

## 2024-02-08 PROCEDURE — 99214 OFFICE O/P EST MOD 30 MIN: CPT | Mod: 25

## 2024-02-08 PROCEDURE — 94729 DIFFUSING CAPACITY: CPT

## 2024-02-08 PROCEDURE — 94618 PULMONARY STRESS TESTING: CPT

## 2024-02-08 RX ORDER — SODIUM CHLORIDE FOR INHALATION 3 %
3 VIAL, NEBULIZER (ML) INHALATION
Qty: 3 | Refills: 3 | Status: ACTIVE | COMMUNITY
Start: 2024-02-08 | End: 1900-01-01

## 2024-02-08 RX ORDER — AZELASTINE HYDROCHLORIDE 137 UG/1
137 SPRAY, METERED NASAL TWICE DAILY
Qty: 1 | Refills: 3 | Status: ACTIVE | COMMUNITY
Start: 2024-02-08 | End: 1900-01-01

## 2024-02-08 RX ORDER — FLUTICASONE PROPIONATE 50 UG/1
50 SPRAY, METERED NASAL
Qty: 3 | Refills: 3 | Status: ACTIVE | COMMUNITY
Start: 2024-02-08 | End: 1900-01-01

## 2024-02-08 NOTE — PHYSICAL EXAM
[No Acute Distress] : no acute distress [No Resp Distress] : no resp distress [Oriented x3] : oriented x3 [Normal Affect] : normal affect [Nasal congestion] : nasal congestion [No Acc Muscle Use] : no acc muscle use [Normal Gait] : normal gait [Normal Mood] : normal mood [TextBox_11] : erythematous nasal turbinates [TextBox_44] : well healing posterior cervical scar [TextBox_68] : velcro rales more prominent in the bases and middle lung fields

## 2024-02-08 NOTE — HISTORY OF PRESENT ILLNESS
[Former] : former [< 20 pack-years] : < 20 pack-years [Never] : never [TextBox_4] : 58yo woman with HLD, Raynaud's and asthma referred for evaluation of CT chest findings. She is pending cervical spine surgery and during that workup had chest imaging. Imaging done at [facility]. No pulmonary symptoms. She had a diagnosis of asthma about 30 years ago with rare use of FRANCISCO JAVIER. Also diagnosed with Raynaud's at around the time. Able to exercise without any limitations. 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 No direct exposures to asbestos, mold that she is aware of. Very mild smoking in her 20s, but minimal. Never intubated for asthma; rare prednisone use. Does not use a maintenance inhaler.  7/15/2020 58 yo F with Raynaud's syndrome initially presenting for abnormal CT scan performed in the context of a pre-op eval and prior abnormal CT. She is here for follow up to discuss lab test results and PFT for further evaluation. Aside from her persistent neck and back pain, she feels well and has no respiratory symptoms including dyspnea (at rest and exertion).  12/23/2022 Last follow up in 2020. Had COVID in Sept/Oct 2022 and since then has had an issue with chest congestion and cough. Recovered from COVID at home; did not take paxlovid. No imaging since 2020.  2/9/23 Patient presenting for follow up on cough. She has had prolonged coughing triggered by PFTs she performed in last January. The cough has been episodic, occurring throughout the day, and occasionally productive of white/yellow sputum. No associated fever, chills, dyspnea, nausea or vomiting. She was prescribed ICS but was escalated to ICS/LABA by her PCP w/ mild improvement. She had a chest xray performed which she was told was normal and was prescribed a course of doxycycline which has helped improve her cough. She has noted occasional wheezing during coughing fits and the cough occurs at night. She has not taken any prednisone.  4/21/2023 Improved and at baseline. No SOB. Using ICS/LABA once a day. LAMA once a day. Allergies are exacerbating.  8/4/2023 Had her C spine surgery; still in neck collar. Pain improving. Pulmonary symptoms have been stable. No new exacerbations.   2/8/2024 PFTs/6MWT today. She feels about the same as last time. She says she walks during her lunch break and she usually has no trouble. She does note in the mornings she has chest congestion that she has to cough out. She notes that she also occasionally has to clear her throat when she is talking to her friends. She says overall her activity level has decreased she is fully remote working from home now.  [ESS] : 6

## 2024-02-08 NOTE — ASSESSMENT
[FreeTextEntry1] : REVIEWED  CT 1/23/22: Lungs are stable. Stable right apical scarring and bronchiectasis. Scattered pleural-based centimeter or last pulmonary nodular densities. No areas of airspace consolidation. No suspicious pulmonary nodules.  PFT 1/25/23 FVC 2.01 (70), FEV1 1.64 (73), FEV1/FVC 81, TLC 3.39 (74), RV 1.3 (6.9), DLCO 10.04 (48)  6MWT 1/25/23 467 m, Pre spO2 96%, Post 92%. Alex 2->2.  PFT 2/8/2024 FVC 2.04 (72), FEV1 1.73 (78), FEV1/FVC 84, TLC 2.70 (59), RV .65 (34), DLCO 9.91 (48) 6MWT: Walked 509 meters, resting O2 97%, end test 94%, both on room air, Alex scale SOB end of test 1, Alex scale fatigue end of test 0   CT chest report 2/2024: UDMI imaging apical parenchymal changes 0.9cm left apical nodule 0.9cm right apical nodule bronchiectasis with volume loss mucous filled bronchi multiple other noudles subpleural fibrosis RLL bulla  58yo woman with HLD, Raynaud's and ILD following up. 1. ILD - Patient with evidence of nodular densities distributed along the periphery of her lung with interstitial lung markings. She also has some focal bronchiectasis and thickening of the bronchovascular bundle. Mucus plugging also noted in the small airways on the latest CT chest report. Her CT scan is suspected to be secondary to CTD vs other idiopathic cause (as it does not fit UIP or NSIP patterns on CT). Will refer to Dr. Yo for repeat rheumatologic workup. Last PFTs with moderate reduction in lung capacities and DLCO however she is functionally asx and doing well. At baseline she has no pulmonary symptoms and thus far we have elected to clinically observe her which she is in agreement with. Has a diagnosis of asthma  predates current CT chest findings.   Her morning cough and constant clearing of the throat is consistent with post nasal drip and we will order Flonase and Azelastine to be taken BID for at least 4 weeks.   -c/w Symbicort -prescribed Hypertonic nebs for mucus clearance  -Azelastine and Flonase BID ordered  -repeat CT chest, PFTs, and 6MWT in 1 year  Follow up in 4-6 months

## 2024-02-08 NOTE — CONSULT LETTER
[Dear  ___] : Dear  [unfilled], [Courtesy Letter:] : I had the pleasure of seeing your patient, [unfilled], in my office today. [Please see my note below.] : Please see my note below. [Consult Closing:] : Thank you very much for allowing me to participate in the care of this patient.  If you have any questions, please do not hesitate to contact me. [Sincerely,] : Sincerely, [FreeTextEntry3] : Helena Cota MD  [employer] at [facility] Pulmonary, Critical Care, and Sleep Medicine

## 2024-04-09 ENCOUNTER — NON-APPOINTMENT (OUTPATIENT)
Age: 64
End: 2024-04-09

## 2024-07-26 ENCOUNTER — APPOINTMENT (OUTPATIENT)
Dept: CT IMAGING | Facility: HOSPITAL | Age: 64
End: 2024-07-26
Payer: COMMERCIAL

## 2024-07-26 ENCOUNTER — RESULT REVIEW (OUTPATIENT)
Age: 64
End: 2024-07-26

## 2024-07-26 PROCEDURE — 71250 CT THORAX DX C-: CPT | Mod: 26

## 2024-08-09 ENCOUNTER — NON-APPOINTMENT (OUTPATIENT)
Age: 64
End: 2024-08-09

## 2024-08-12 ENCOUNTER — APPOINTMENT (OUTPATIENT)
Dept: PULMONOLOGY | Facility: CLINIC | Age: 64
End: 2024-08-12
Payer: COMMERCIAL

## 2024-08-12 DIAGNOSIS — J47.9 BRONCHIECTASIS, UNCOMPLICATED: ICD-10-CM

## 2024-08-12 PROCEDURE — 99443: CPT

## 2024-08-12 PROCEDURE — G2211 COMPLEX E/M VISIT ADD ON: CPT | Mod: NC

## 2024-08-12 NOTE — ASSESSMENT
[FreeTextEntry1] : REVIEWED CT 1/23/22: Lungs are stable. Stable right apical scarring and bronchiectasis. Scattered pleural-based centimeter or last pulmonary nodular densities. No areas of airspace consolidation. No suspicious pulmonary nodules.  PFT 1/25/23 FVC 2.01 (70), FEV1 1.64 (73), FEV1/FVC 81, TLC 3.39 (74), RV 1.3 (6.9), DLCO 10.04 (48)  6MWT 1/25/23 467 m, Pre spO2 96%, Post 92%. Alex 2->2.  PFT 2/8/2024 FVC 2.04 (72), FEV1 1.73 (78), FEV1/FVC 84, TLC 2.70 (59), RV.65 (34), DLCO 9.91 (48) 6MWT: Walked 509 meters, resting O2 97%, end test 94%, both on room air, Alex scale SOB end of test 1, Alex scale fatigue end of test 0  CT chest report 2/2024: UDMI imaging apical parenchymal changes 0.9cm left apical nodule 0.9cm right apical nodule bronchiectasis with volume loss mucous filled bronchi multiple other noudles subpleural fibrosis RLL bulla  CT chest from 7/2024: persistent traction bronchiectasis, reticulation, pleural based consolidations; mosaicism; overall stable compared to prior  58yo woman with HLD, Raynaud's and ILD following up. Stable from pulm perspective. Dealing with a lot of other med issues. 1. ILD - Patient with evidence of nodular densities distributed along the periphery of her lung with interstitial lung markings. She also has some focal bronchiectasis and thickening of the bronchovascular bundle. Mucus plugging also noted in the small airways on the latest CT chest report. Her CT scan is suspected to be secondary to CTD vs other idiopathic cause (as it does not fit UIP or NSIP patterns on CT). Repeat imaging stable. Last PFTs with moderate reduction in lung capacities and DLCO however she is functionally asx and doing well. At baseline she has minimal pulmonary symptoms and thus far we have elected to clinically observe her which she is in agreement with. Has a diagnosis of asthma predates current CT chest findings. Did discuss open lung biopsy with her but she would like to defer and is worried about possible risk of exacerbation.  -c/w Symbicort -prescribed Hypertonic nebs for mucus clearance: using it PRN -Azelastine and Flonase BID ordered  Follow up in 4-6 months.

## 2024-08-12 NOTE — HISTORY OF PRESENT ILLNESS
[Home] : at home, [unfilled] , at the time of the visit. [Medical Office: ([facility])___] : at the medical office located in  [Verbal consent obtained from patient] : the patient, [unfilled] [Former] : former [< 20 pack-years] : < 20 pack-years [Never] : never [TextBox_4] : 58yo woman with HLD, Raynaud's and asthma referred for evaluation of CT chest findings. She is pending cervical spine surgery and during that workup had chest imaging. Imaging done at [facility]. No pulmonary symptoms. She had a diagnosis of asthma about 30 years ago with rare use of FRANCISCO JAVIER. Also diagnosed with Raynaud's at around the time. Able to exercise without any limitations. 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 No direct exposures to asbestos, mold that she is aware of. Very mild smoking in her 20s, but minimal. Never intubated for asthma; rare prednisone use. Does not use a maintenance inhaler.  7/15/2020 58 yo F with Raynaud's syndrome initially presenting for abnormal CT scan performed in the context of a pre-op eval and prior abnormal CT. She is here for follow up to discuss lab test results and PFT for further evaluation. Aside from her persistent neck and back pain, she feels well and has no respiratory symptoms including dyspnea (at rest and exertion).  12/23/2022 Last follow up in 2020. Had COVID in Sept/Oct 2022 and since then has had an issue with chest congestion and cough. Recovered from COVID at home; did not take paxlovid. No imaging since 2020.  2/9/23 Patient presenting for follow up on cough. She has had prolonged coughing triggered by PFTs she performed in last January. The cough has been episodic, occurring throughout the day, and occasionally productive of white/yellow sputum. No associated fever, chills, dyspnea, nausea or vomiting. She was prescribed ICS but was escalated to ICS/LABA by her PCP w/ mild improvement. She had a chest xray performed which she was told was normal and was prescribed a course of doxycycline which has helped improve her cough. She has noted occasional wheezing during coughing fits and the cough occurs at night. She has not taken any prednisone.  4/21/2023 Improved and at baseline. No SOB. Using ICS/LABA once a day. LAMA once a day. Allergies are exacerbating.  8/4/2023 Had her C spine surgery; still in neck collar. Pain improving. Pulmonary symptoms have been stable. No new exacerbations.  2/8/2024 PFTs/6MWT today. She feels about the same as last time. She says she walks during her lunch break and she usually has no trouble. She does note in the mornings she has chest congestion that she has to cough out. She notes that she also occasionally has to clear her throat when she is talking to her friends. She says overall her activity level has decreased she is fully remote working from home now.    8/12/2024 Discussed CT chest. SOB at baseline, not worse. Swimming. Recovering from neck surgery.  [ESS] : 4

## 2024-08-12 NOTE — CONSULT LETTER
[Dear  ___] : Dear  [unfilled], [Courtesy Letter:] : I had the pleasure of seeing your patient, [unfilled], in my office today. [Please see my note below.] : Please see my note below. [Consult Closing:] : Thank you very much for allowing me to participate in the care of this patient.  If you have any questions, please do not hesitate to contact me. [Sincerely,] : Sincerely, [FreeTextEntry3] : Helena Cota MD  [employer] at [facility] Pulmonary, Critical Care, and Sleep Medicine

## 2024-08-20 ENCOUNTER — APPOINTMENT (OUTPATIENT)
Dept: RHEUMATOLOGY | Facility: CLINIC | Age: 64
End: 2024-08-20
Payer: COMMERCIAL

## 2024-08-20 VITALS
TEMPERATURE: 98.1 F | SYSTOLIC BLOOD PRESSURE: 121 MMHG | BODY MASS INDEX: 30 KG/M2 | OXYGEN SATURATION: 98 % | DIASTOLIC BLOOD PRESSURE: 85 MMHG | HEART RATE: 83 BPM | WEIGHT: 163 LBS | HEIGHT: 62 IN

## 2024-08-20 DIAGNOSIS — J84.9 INTERSTITIAL PULMONARY DISEASE, UNSPECIFIED: ICD-10-CM

## 2024-08-20 DIAGNOSIS — R76.8 OTHER SPECIFIED ABNORMAL IMMUNOLOGICAL FINDINGS IN SERUM: ICD-10-CM

## 2024-08-20 DIAGNOSIS — I73.00 RAYNAUD'S SYNDROME W/OUT GANGRENE: ICD-10-CM

## 2024-08-20 PROCEDURE — G2211 COMPLEX E/M VISIT ADD ON: CPT | Mod: NC

## 2024-08-20 PROCEDURE — 99205 OFFICE O/P NEW HI 60 MIN: CPT

## 2024-08-20 PROCEDURE — 36415 COLL VENOUS BLD VENIPUNCTURE: CPT

## 2024-08-20 RX ORDER — ROSUVASTATIN CALCIUM 5 MG/1
5 TABLET, FILM COATED ORAL
Refills: 0 | Status: ACTIVE | COMMUNITY

## 2024-08-21 LAB
ALBUMIN SERPL ELPH-MCNC: 4.5 G/DL
ALP BLD-CCNC: 122 U/L
ALT SERPL-CCNC: 14 U/L
ANION GAP SERPL CALC-SCNC: 14 MMOL/L
AST SERPL-CCNC: 20 U/L
B2 GLYCOPROT1 IGG SER-ACNC: <1.4 U/ML
B2 GLYCOPROT1 IGM SER-ACNC: 2.6 U/ML
BILIRUB SERPL-MCNC: 0.2 MG/DL
BUN SERPL-MCNC: 14 MG/DL
CALCIUM SERPL-MCNC: 9.8 MG/DL
CARDIOLIPIN IGM SER-MCNC: <1.5 MPL U/ML
CARDIOLIPIN IGM SER-MCNC: <1.6 GPL U/ML
CENTROMERE IGG SER-ACNC: <0.2 AL
CHLORIDE SERPL-SCNC: 103 MMOL/L
CO2 SERPL-SCNC: 26 MMOL/L
CREAT SERPL-MCNC: 0.77 MG/DL
DSDNA AB SER-ACNC: <1 IU/ML
EGFR: 86 ML/MIN/1.73M2
ENA RNP AB SER IA-ACNC: 0.6 AL
ENA SCL70 IGG SER IA-ACNC: <0.2 AL
ENA SM AB SER IA-ACNC: <0.2 AL
ENA SS-A AB SER IA-ACNC: <0.2 AL
ENA SS-B AB SER IA-ACNC: <0.2 AL
GLUCOSE SERPL-MCNC: 91 MG/DL
POTASSIUM SERPL-SCNC: 5.1 MMOL/L
PROT SERPL-MCNC: 7.9 G/DL
SODIUM SERPL-SCNC: 143 MMOL/L

## 2024-08-21 NOTE — HISTORY OF PRESENT ILLNESS
[FreeTextEntry1] : August 20, 2024 63-year-old woman referred by pulmonary for rheumatology evaluation. Patient with history of Raynaud's symptoms Patient also noted to have ILD during previous preop evaluation for cervical spine surgery.  Patient with longstanding history of asthma treated with inhalers as needed Following with pulmonary, repeat CT chest completed, relatively stable. Patient feels well in terms of pulmonary symptoms Saw rheumatologist years ago prior to ILD diagnosis In 2020, NORM positive, 1: 320 speckled and nucleolar SSA SSB negative, double-stranded DNA negative scleroderma antibody negative Leeanna 1 antibody negative centromere antibody negative ribosomal P antibody negative +Raynaud's hands, without involvement of the feet +ILD GERD symptoms Evaluated prior to lung disease  Mother with arthritis when older, pancreatic cancer At present, breathing is "fine" [place]ikes [place] feels harder to swim the same distance as before +swims outdoors in the summer  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[employer]  +Raynauds, happens mostly in the cold temperatures, not as bad as previously Turns bluish, whitish as well, fingers only, without involvement of the lower extremities  right 2nd PIP hand with flexion +joint pain,  History of neck and back issues, had cervical fusion last year, at [facility] in [place] Prior to that also in 2020 in [facility]  +dysphagia, feels may be related to surgery of the cervical spine, hard to swallow large pills +rarely GERD symptoms +stomach issues, prescribed medications but trying to modify diet instead as does not like to take medication  No history of DVT, no history anticoagulation  No skin changes No change in oral aperture although looks decreased  [place] Covid infection about two months ago 1st covid infection two years ago, severe respiratory symptoms Had recent follow up with pulmonary, repeat CT discussed with patient as well Also had recent urology procedure, has follow-up procedure scheduled as well.

## 2024-08-21 NOTE — PHYSICAL EXAM
[General Appearance - Alert] : alert [General Appearance - In No Acute Distress] : in no acute distress [General Appearance - Well Nourished] : well nourished [General Appearance - Well-Appearing] : healthy appearing [Sclera] : the sclera and conjunctiva were normal [Oropharynx] : the oropharynx was normal [Respiration, Rhythm And Depth] : normal respiratory rhythm and effort [Exaggerated Use Of Accessory Muscles For Inspiration] : no accessory muscle use [Heart Rate And Rhythm] : heart rate was normal and rhythm regular [Heart Sounds] : normal S1 and S2 [Murmurs] : no murmurs [Edema] : there was no peripheral edema [Abnormal Walk] : normal gait [Nail Clubbing] : no clubbing  or cyanosis of the fingernails [Musculoskeletal - Swelling] : no joint swelling seen [Motor Tone] : muscle strength and tone were normal [] : no rash [Oriented To Time, Place, And Person] : oriented to person, place, and time [Impaired Insight] : insight and judgment were intact [Affect] : the affect was normal [Mood] : the mood was normal [FreeTextEntry1] : No sclerodactyly, no digital ulcers or pitting

## 2024-08-21 NOTE — ASSESSMENT
[FreeTextEntry1] : 63-year-old woman referred by pulmonologist for rheumatology evaluation.  Patient with longstanding history of Raynaud's symptoms predominantly of the hands, noted to have ILD during preop evaluation for cervical spine surgery in 2020.  Since that time, patient has been following with pulmonary, previous rheumatology evaluation notable for NORM +1: 320 with a speckled and nucleolar pattern with all other serologies negative as well as negative biomarker panel.  Patient denies skin changes or sclerodactyly.  Raynaud's symptoms are manageable with hand warming strategies.  At this time, patient with perioral skin changes that could be suggestive of sclerodermatous changes, however patient reports unchanged over many years, without other signs or symptoms of systemic sclerosis.  Patient does not have other features of crest syndrome other than Raynaud's, although does have some reflux symptoms treated with as needed PPI.  At this time, given ILD, will obtain further testing today including NORM, centromere antibody, double-stranded DNA, STALIN, Isaiah marker panel, RNA polymerase 3 antibody, scleroderma antibodies, SSA and SSB, given Raynaud's symptoms, will also check antiphospholipid antibodies at this time.  Hand warming strategies discussed.  Patient will follow-up in 3 to 4 weeks, further management pending results

## 2024-08-21 NOTE — HISTORY OF PRESENT ILLNESS
[FreeTextEntry1] : August 20, 2024 63-year-old woman referred by pulmonary for rheumatology evaluation. Patient with history of Raynaud's symptoms Patient also noted to have ILD during previous preop evaluation for cervical spine surgery.  Patient with longstanding history of asthma treated with inhalers as needed Following with pulmonary, repeat CT chest completed, relatively stable. Patient feels well in terms of pulmonary symptoms Saw rheumatologist years ago prior to ILD diagnosis In 2020, NORM positive, 1: 320 speckled and nucleolar SSA SSB negative, double-stranded DNA negative scleroderma antibody negative Leeanna 1 antibody negative centromere antibody negative ribosomal P antibody negative +Raynaud's hands, without involvement of the feet +ILD GERD symptoms Evaluated prior to lung disease  Mother with arthritis when older, pancreatic cancer At present, breathing is "fine" Likes to swim, [place], although feels harder to swim the same distance as before +swims outdoors in the summer  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  +dysphagia, feels may be related to surgery of the cervical spine, hard to swallow large pills +rarely GERD symptoms +stomach issues, prescribed medications but trying to modify diet instead as does not like to take medication  No history of DVT, no history anticoagulation  No skin changes No change in oral aperture although looks decreased  [place] Covid infection about two months ago 1st covid infection two years ago, severe respiratory symptoms Had recent follow up with pulmonary, repeat CT discussed with patient as well Also had recent urology procedure, has follow-up procedure scheduled as well.

## 2024-08-23 LAB
ANA PAT FLD IF-IMP: ABNORMAL
ANA SER IF-ACNC: ABNORMAL

## 2024-08-25 LAB — RNA POLYMERASE III IGG: 23 UNITS

## 2024-11-11 ENCOUNTER — APPOINTMENT (OUTPATIENT)
Dept: PULMONOLOGY | Facility: CLINIC | Age: 64
End: 2024-11-11
Payer: COMMERCIAL

## 2024-11-11 ENCOUNTER — NON-APPOINTMENT (OUTPATIENT)
Age: 64
End: 2024-11-11

## 2024-11-11 VITALS
WEIGHT: 165.25 LBS | TEMPERATURE: 97.1 F | HEART RATE: 74 BPM | OXYGEN SATURATION: 95 % | HEIGHT: 62 IN | DIASTOLIC BLOOD PRESSURE: 83 MMHG | SYSTOLIC BLOOD PRESSURE: 130 MMHG | BODY MASS INDEX: 30.41 KG/M2

## 2024-11-11 DIAGNOSIS — J84.9 INTERSTITIAL PULMONARY DISEASE, UNSPECIFIED: ICD-10-CM

## 2024-11-11 DIAGNOSIS — J18.9 PNEUMONIA, UNSPECIFIED ORGANISM: ICD-10-CM

## 2024-11-11 PROCEDURE — 99214 OFFICE O/P EST MOD 30 MIN: CPT

## 2024-11-11 PROCEDURE — G2211 COMPLEX E/M VISIT ADD ON: CPT | Mod: NC

## 2024-11-11 RX ORDER — AZITHROMYCIN 250 MG/1
250 TABLET, FILM COATED ORAL
Qty: 1 | Refills: 0 | Status: ACTIVE | COMMUNITY
Start: 2024-11-11 | End: 1900-01-01

## 2024-11-11 RX ORDER — SACCHAROMYCES BOULARDII 250 MG
250 CAPSULE ORAL TWICE DAILY
Qty: 60 | Refills: 0 | Status: ACTIVE | COMMUNITY
Start: 2024-11-11 | End: 1900-01-01

## 2024-11-22 RX ORDER — PREDNISONE 20 MG/1
20 TABLET ORAL DAILY
Qty: 14 | Refills: 0 | Status: ACTIVE | COMMUNITY
Start: 2024-11-22 | End: 1900-01-01

## 2024-12-02 ENCOUNTER — APPOINTMENT (OUTPATIENT)
Dept: PULMONOLOGY | Facility: CLINIC | Age: 64
End: 2024-12-02
Payer: COMMERCIAL

## 2024-12-02 ENCOUNTER — APPOINTMENT (OUTPATIENT)
Dept: PULMONOLOGY | Facility: CLINIC | Age: 64
End: 2024-12-02

## 2024-12-02 DIAGNOSIS — J47.9 BRONCHIECTASIS, UNCOMPLICATED: ICD-10-CM

## 2024-12-02 DIAGNOSIS — J84.9 INTERSTITIAL PULMONARY DISEASE, UNSPECIFIED: ICD-10-CM

## 2024-12-02 PROCEDURE — 99443: CPT
